# Patient Record
(demographics unavailable — no encounter records)

---

## 2017-09-28 NOTE — UC
Abdominal Pain Female HPI





- HPI Summary


HPI Summary: 





Pt presents with c/o of abdominal pain, fever and diarrhea X 3 days.  Pt 

reports that she completed a  7 day prescription of Keflex 500 mg PO  Q12H and 

began a prescription of Augmentin on 9/25/17 Q12h. Pt began having body aches, 

fever, abdominal pain ~ 3 days ago.  Pt states that she came home from work 

today and took her temperature and noted it was 103 F Pt aslo reports taht she 

has had worsening abdominal pain and diarrhea that is "horribly smelly"  





- History of Current Complaint


Chief Complaint: UCGI


Stated Complaint: DIARRHEA FEVER HEADACHE


Time Seen by Provider: 09/28/17 21:27


Hx Obtained From: Patient


Hx Last Menstrual Period: about 3 weeks ago


Pregnant?: No


Onset/Duration: Gradual Onset, Lasting Days, Worse Since - onset


Timing: Constant


Severity Initially: Mild


Severity Currently: Moderate


Location: Diffuse


Radiates: No


Character: Colicy, Cramping, Sharp


Aggravating Factor(s): Food


Alleviating Factor(s): Nothing


Associated Signs and Symptoms: Positive: Fever, Diarrhea


Allergies/Adverse Reactions: 


 Allergies











Allergy/AdvReac Type Severity Reaction Status Date / Time


 


Clarithromycin [From Biaxin] Allergy  Abdominal Verified 09/28/17 21:16





   Pain  











Home Medications: 


 Home Medications





Amoxicillin/Clavulanate TAB* [Augmentin *] 875 mg PO BID 09/28/17 [

History Confirmed 09/28/17]


O C 1 tab PO BEDTIME 09/28/17 [History Confirmed 09/28/17]











PMH/Surg Hx/FS Hx/Imm Hx


Previously Healthy: Yes





- Surgical History


Surgical History: Yes


Surgery Procedure, Year, and Place: [facility]





- Family History


Known Family History: Positive: Cardiac Disease





- Social History


Occupation: Employed Full-time


Lives: With Family


Alcohol Use: Occasionally


Substance Use Type: None


Smoking Status (MU): Never Smoked Tobacco


Have You Smoked in the Last Year: No





Review of Systems


Constitutional: Fever, Chills, Fatigue


Skin: Negative


Eyes: Negative


ENT: Negative


Respiratory: Negative


Cardiovascular: Negative


Gastrointestinal: Abdominal Pain, Diarrhea


Genitourinary: Negative


Motor: Negative


Neurovascular: Negative


Musculoskeletal: Myalgia


Neurological: Headache


Psychological: Negative


Is Patient Immunocompromised?: No


All Other Systems Reviewed And Are Negative: Yes





Physical Exam


Triage Information Reviewed: Yes


Appearance: Ill-Appearing


Vital Signs: 


 Initial Vital Signs











Temp  99.4 F   09/28/17 20:59


 


Pulse  96   09/28/17 20:59


 


Resp  18   09/28/17 20:59


 


BP  120/50   09/28/17 20:59











Vital Signs Reviewed: Yes


Eye Exam: Normal


Neck exam: Normal


Respiratory Exam: Normal


Cardiovascular Exam: Normal


Abdominal Exam: Other


Abdomen Description: Positive: Other: - generalized tenderness


Bowel Sounds: Positive: Present


Musculoskeletal Exam: Normal


Neurological Exam: Normal


Psychological Exam: Normal


Skin Exam: Normal





Abd Pain Female Course/Dx





- Course


Course Of Treatment: I disdcussed withthe pt my concerns about the length of 

time that she has been taking antibiotics and the onset of foul smelling 

diarrhea and fever for possible C-difficile.  I recommended that she go to the 

closest ER for further evaluation and testing.  Pt verbalized understanding and 

agreed to plan of care.





- Differential Dx/Diagnosis


Differential Diagnosis: Diverticulitis, Other - Gastroenteritis C-Difficile


Provider Diagnoses: Abdominal pain.  C-Difficile- ?





- Physician Notification/Consults


Discussed Care of Patient With: Dr. Huggins at [facility] - Pt accepted by Dr. Huggins


Time Discussed With Above Provider: 22:04





Discharge





- Discharge Plan


Condition: Stable


Disposition: HOME


Patient Education Materials:  Fever in Adults (ED), Acute Diarrhea (ED), 

Abdominal Pain (ED)


Referrals: 


Doretha Alex MD [Primary Care Provider] - If Needed


Additional Instructions: 


It is recommended that you go to the closest Emergency Room for immediate 

evaluation.  Your exam has revealed concern for an infection called Clostridium 

Difficile.

## 2018-03-17 NOTE — UC
Lower Extremity/Ankle HPI





- HPI Summary


HPI Summary: 





LEFT LOWER LEG PAIN X 1 DAY


WAS HIT HARD TO HER LEFT LOWER LEG PLAYING SOCCER 


PAIN WITH WALKING HAS BEEN LIMPING 





- History of Current Complaint


Chief Complaint: UCLowerExtremity


Stated Complaint: LEFT LEG INJURY


Time Seen by Provider: 03/17/18 09:11


Hx Obtained From: Patient


Hx Last Menstrual Period: 02/28/18


Pregnant?: No


Onset/Duration: Sudden Onset, Lasting Days - 1, Still Present


Severity Initially: Moderate


Severity Currently: Moderate


Pain Intensity: 4


Aggravating Factor(s): Standing, Ambulation


Able to Bear Weight: Yes





- Allergies/Home Medications


Allergies/Adverse Reactions: 


 Allergies











Allergy/AdvReac Type Severity Reaction Status Date / Time


 


clarithromycin [From Biaxin] AdvReac  Abdominal Verified 03/17/18 09:53





   Pain  











Home Medications: 


 Home Medications





Ibuprofen [Advil] 800 mg PO ONCE PRN 03/17/18 [History Confirmed 03/17/18]


Norethindrone-E.estradiol-Iron [Minastrin 24 Fe 1-20 mg-Mcg(24)] 1 chw PO DAILY 

03/17/18 [History Confirmed 03/17/18]


Sertraline* [Zoloft*] 200 mg PO BEDTIME 03/17/18 [History Confirmed 03/17/18]











PMH/Surg Hx/FS Hx/Imm Hx


Respiratory History: Asthma


Psychological History: Anxiety





- Surgical History


Surgical History: Yes


Surgery Procedure, Year, and Place: [facility]





- Family History


Known Family History: Positive: Cardiac Disease





- Social History


Alcohol Use: Occasionally


Substance Use Type: None


Smoking Status (MU): Never Smoked Tobacco


Have You Smoked in the Last Year: No





Review of Systems


Constitutional: Negative


Skin: Negative


Eyes: Negative


ENT: Negative


Respiratory: Negative


Cardiovascular: Negative


Is Patient Immunocompromised?: No


All Other Systems Reviewed And Are Negative: Yes





Physical Exam


Triage Information Reviewed: Yes


Appearance: Well-Appearing, No Pain Distress, Well-Nourished


Vital Signs: 


 Initial Vital Signs











Temp  98.9 F   03/17/18 09:33


 


Pulse  78   03/17/18 09:33


 


Resp  16   03/17/18 09:33


 


BP  119/67   03/17/18 09:33


 


Pulse Ox  99   03/17/18 09:33











Vital Signs Reviewed: Yes


Eye Exam: Normal


Eyes: Positive: Conjunctiva Clear


ENT: Positive: Normal ENT inspection, Hearing grossly normal, Pharynx normal


Neck: Positive: Supple, Nontender, No Lymphadenopathy


Respiratory: Positive: Chest non-tender, Lungs clear, Normal breath sounds


Cardiovascular: Positive: RRR, No Murmur, Pulses Normal


Musculoskeletal: Positive: Other: - LEFT LOWER LEG : NO SWELLING, NO ERYTHEMA, 

+ TENDERNESS DISTAL TIBIA, LEFT ANKLE, NO TEDNERENSS, NO SWELLINGN , GOOD ROM


Skin Exam: Normal





Diagnostics





- Laboratory


Diagnostic Studies Completed/Ordered: xray left lower leg : no fracture seen





Lower Extremity Course/Dx





- Differential Dx/Diagnosis


Provider Diagnoses: CONTUSION LEFT LOWER LEG





Discharge





- Discharge Plan


Condition: Stable


Disposition: HOME


Patient Education Materials:  Contusion in Adults (ED)


Referrals: 


Doretha Alex MD [Primary Care Provider] - 7 Days


Additional Instructions: 


normal xray of left lower leg


cont with rest, ice, elevation, take Ibuprofen 600 mg every 6 hrs as needed for 

pain and inflammation


use crutches for few days 


follow up with your pcp in 7 days

## 2018-03-17 NOTE — RAD
Indication: Medial left lower leg pain after soccer injury



Comparison: None.



Technique: AP and lateral views left lower leg.



Report: 



The visualized bones are adequately corticated and well aligned. There is no acute

fracture, dislocation or other focal abnormality. The soft tissues appear grossly normal.



IMPRESSION: 

Normal left lower leg radiograph.



If the patient's symptoms persist, follow-up imaging is recommended.